# Patient Record
Sex: FEMALE | Race: OTHER | URBAN - METROPOLITAN AREA
[De-identification: names, ages, dates, MRNs, and addresses within clinical notes are randomized per-mention and may not be internally consistent; named-entity substitution may affect disease eponyms.]

---

## 2023-12-12 ENCOUNTER — HOSPITAL ENCOUNTER (EMERGENCY)
Facility: HOSPITAL | Age: 43
Discharge: HOME | End: 2023-12-12
Attending: EMERGENCY MEDICINE

## 2023-12-12 ENCOUNTER — APPOINTMENT (EMERGENCY)
Dept: CT IMAGING | Facility: HOSPITAL | Age: 43
End: 2023-12-12

## 2023-12-12 VITALS
OXYGEN SATURATION: 98 % | TEMPERATURE: 98.2 F | SYSTOLIC BLOOD PRESSURE: 125 MMHG | DIASTOLIC BLOOD PRESSURE: 62 MMHG | BODY MASS INDEX: 30.79 KG/M2 | RESPIRATION RATE: 18 BRPM | HEART RATE: 79 BPM | HEIGHT: 66 IN | WEIGHT: 191.58 LBS

## 2023-12-12 DIAGNOSIS — R11.0 NAUSEA: ICD-10-CM

## 2023-12-12 DIAGNOSIS — R93.5 ABNORMAL CT SCAN, PELVIS: ICD-10-CM

## 2023-12-12 DIAGNOSIS — N30.90 CYSTITIS: Primary | ICD-10-CM

## 2023-12-12 DIAGNOSIS — N28.89 URETERITIS: ICD-10-CM

## 2023-12-12 LAB
ALBUMIN SERPL BCP-MCNC: 4.3 G/DL (ref 3.5–5)
ALP SERPL-CCNC: 88 U/L (ref 34–104)
ALT SERPL W P-5'-P-CCNC: 11 U/L (ref 7–52)
ANION GAP SERPL CALCULATED.3IONS-SCNC: 8 MMOL/L
AST SERPL W P-5'-P-CCNC: 11 U/L (ref 13–39)
BACTERIA UR QL AUTO: ABNORMAL /HPF
BASOPHILS # BLD AUTO: 0.06 THOUSANDS/ÂΜL (ref 0–0.1)
BASOPHILS NFR BLD AUTO: 0 % (ref 0–1)
BILIRUB DIRECT SERPL-MCNC: 0.04 MG/DL (ref 0–0.2)
BILIRUB SERPL-MCNC: 0.26 MG/DL (ref 0.2–1)
BILIRUB UR QL STRIP: NEGATIVE
BUN SERPL-MCNC: 9 MG/DL (ref 5–25)
CALCIUM SERPL-MCNC: 8.9 MG/DL (ref 8.4–10.2)
CHLORIDE SERPL-SCNC: 102 MMOL/L (ref 96–108)
CLARITY UR: ABNORMAL
CO2 SERPL-SCNC: 22 MMOL/L (ref 21–32)
COLOR UR: YELLOW
CREAT SERPL-MCNC: 0.63 MG/DL (ref 0.6–1.3)
EOSINOPHIL # BLD AUTO: 0.13 THOUSAND/ÂΜL (ref 0–0.61)
EOSINOPHIL NFR BLD AUTO: 1 % (ref 0–6)
ERYTHROCYTE [DISTWIDTH] IN BLOOD BY AUTOMATED COUNT: 13.9 % (ref 11.6–15.1)
EXT PREGNANCY TEST URINE: NEGATIVE
EXT. CONTROL: NORMAL
GFR SERPL CREATININE-BSD FRML MDRD: 109 ML/MIN/1.73SQ M
GLUCOSE SERPL-MCNC: 137 MG/DL (ref 65–140)
GLUCOSE UR STRIP-MCNC: NEGATIVE MG/DL
HCT VFR BLD AUTO: 35 % (ref 34.8–46.1)
HGB BLD-MCNC: 11.7 G/DL (ref 11.5–15.4)
HGB UR QL STRIP.AUTO: ABNORMAL
IMM GRANULOCYTES # BLD AUTO: 0.05 THOUSAND/UL (ref 0–0.2)
IMM GRANULOCYTES NFR BLD AUTO: 0 % (ref 0–2)
KETONES UR STRIP-MCNC: NEGATIVE MG/DL
LEUKOCYTE ESTERASE UR QL STRIP: ABNORMAL
LIPASE SERPL-CCNC: 12 U/L (ref 11–82)
LYMPHOCYTES # BLD AUTO: 1.75 THOUSANDS/ÂΜL (ref 0.6–4.47)
LYMPHOCYTES NFR BLD AUTO: 12 % (ref 14–44)
MCH RBC QN AUTO: 27.3 PG (ref 26.8–34.3)
MCHC RBC AUTO-ENTMCNC: 33.4 G/DL (ref 31.4–37.4)
MCV RBC AUTO: 82 FL (ref 82–98)
MONOCYTES # BLD AUTO: 0.68 THOUSAND/ÂΜL (ref 0.17–1.22)
MONOCYTES NFR BLD AUTO: 5 % (ref 4–12)
MUCOUS THREADS UR QL AUTO: ABNORMAL
NEUTROPHILS # BLD AUTO: 11.91 THOUSANDS/ÂΜL (ref 1.85–7.62)
NEUTS SEG NFR BLD AUTO: 82 % (ref 43–75)
NITRITE UR QL STRIP: POSITIVE
NON-SQ EPI CELLS URNS QL MICRO: ABNORMAL /HPF
NRBC BLD AUTO-RTO: 0 /100 WBCS
PH UR STRIP.AUTO: 7.5 [PH]
PLATELET # BLD AUTO: 402 THOUSANDS/UL (ref 149–390)
PMV BLD AUTO: 9.1 FL (ref 8.9–12.7)
POTASSIUM SERPL-SCNC: 3.8 MMOL/L (ref 3.5–5.3)
PROT SERPL-MCNC: 7.8 G/DL (ref 6.4–8.4)
PROT UR STRIP-MCNC: ABNORMAL MG/DL
RBC # BLD AUTO: 4.29 MILLION/UL (ref 3.81–5.12)
RBC #/AREA URNS AUTO: ABNORMAL /HPF
SODIUM SERPL-SCNC: 132 MMOL/L (ref 135–147)
SP GR UR STRIP.AUTO: 1.02 (ref 1–1.03)
UROBILINOGEN UR QL STRIP.AUTO: 0.2 E.U./DL
WBC # BLD AUTO: 14.58 THOUSAND/UL (ref 4.31–10.16)
WBC #/AREA URNS AUTO: ABNORMAL /HPF

## 2023-12-12 PROCEDURE — 99284 EMERGENCY DEPT VISIT MOD MDM: CPT

## 2023-12-12 PROCEDURE — 85025 COMPLETE CBC W/AUTO DIFF WBC: CPT | Performed by: EMERGENCY MEDICINE

## 2023-12-12 PROCEDURE — 87086 URINE CULTURE/COLONY COUNT: CPT | Performed by: EMERGENCY MEDICINE

## 2023-12-12 PROCEDURE — 83690 ASSAY OF LIPASE: CPT | Performed by: EMERGENCY MEDICINE

## 2023-12-12 PROCEDURE — 80076 HEPATIC FUNCTION PANEL: CPT | Performed by: EMERGENCY MEDICINE

## 2023-12-12 PROCEDURE — 99285 EMERGENCY DEPT VISIT HI MDM: CPT | Performed by: EMERGENCY MEDICINE

## 2023-12-12 PROCEDURE — 96375 TX/PRO/DX INJ NEW DRUG ADDON: CPT

## 2023-12-12 PROCEDURE — 81025 URINE PREGNANCY TEST: CPT | Performed by: EMERGENCY MEDICINE

## 2023-12-12 PROCEDURE — 87077 CULTURE AEROBIC IDENTIFY: CPT | Performed by: EMERGENCY MEDICINE

## 2023-12-12 PROCEDURE — 87186 SC STD MICRODIL/AGAR DIL: CPT | Performed by: EMERGENCY MEDICINE

## 2023-12-12 PROCEDURE — 96365 THER/PROPH/DIAG IV INF INIT: CPT

## 2023-12-12 PROCEDURE — 36415 COLL VENOUS BLD VENIPUNCTURE: CPT | Performed by: EMERGENCY MEDICINE

## 2023-12-12 PROCEDURE — 80048 BASIC METABOLIC PNL TOTAL CA: CPT | Performed by: EMERGENCY MEDICINE

## 2023-12-12 PROCEDURE — 74177 CT ABD & PELVIS W/CONTRAST: CPT

## 2023-12-12 PROCEDURE — 81001 URINALYSIS AUTO W/SCOPE: CPT | Performed by: EMERGENCY MEDICINE

## 2023-12-12 RX ORDER — ONDANSETRON 2 MG/ML
4 INJECTION INTRAMUSCULAR; INTRAVENOUS ONCE
Status: COMPLETED | OUTPATIENT
Start: 2023-12-12 | End: 2023-12-12

## 2023-12-12 RX ORDER — ONDANSETRON 4 MG/1
4 TABLET, ORALLY DISINTEGRATING ORAL EVERY 6 HOURS PRN
Qty: 20 TABLET | Refills: 0 | Status: SHIPPED | OUTPATIENT
Start: 2023-12-12

## 2023-12-12 RX ORDER — KETOROLAC TROMETHAMINE 30 MG/ML
15 INJECTION, SOLUTION INTRAMUSCULAR; INTRAVENOUS ONCE
Status: COMPLETED | OUTPATIENT
Start: 2023-12-12 | End: 2023-12-12

## 2023-12-12 RX ORDER — CEFTRIAXONE 1 G/50ML
1000 INJECTION, SOLUTION INTRAVENOUS ONCE
Status: COMPLETED | OUTPATIENT
Start: 2023-12-12 | End: 2023-12-12

## 2023-12-12 RX ORDER — CEFPODOXIME PROXETIL 200 MG/1
200 TABLET, FILM COATED ORAL 2 TIMES DAILY
Qty: 20 TABLET | Refills: 0 | Status: SHIPPED | OUTPATIENT
Start: 2023-12-12 | End: 2023-12-22

## 2023-12-12 RX ADMIN — CEFTRIAXONE 1000 MG: 1 INJECTION, SOLUTION INTRAVENOUS at 03:56

## 2023-12-12 RX ADMIN — KETOROLAC TROMETHAMINE 15 MG: 30 INJECTION INTRAMUSCULAR; INTRAVENOUS at 03:16

## 2023-12-12 RX ADMIN — ONDANSETRON 4 MG: 2 INJECTION INTRAMUSCULAR; INTRAVENOUS at 03:16

## 2023-12-12 RX ADMIN — IOHEXOL 100 ML: 350 INJECTION, SOLUTION INTRAVENOUS at 04:11

## 2023-12-12 NOTE — ED PROVIDER NOTES
History  Chief Complaint   Patient presents with    Flank Pain     L sided flank pain starting yesterday morning, (+) nausea, and pt also reports some urinary symptoms. 67-year-old female no past medical history. Patient presents to the emergency department with abdominal pain, right lower back pain, nausea without emesis, increased frequency of urination, burning with urination. Symptoms started in the last day. No fevers. No vomiting. No vaginal discharge. Normal bowel movements. States she is not pregnant. Abdominal Pain  Pain location:  Generalized  Pain radiates to:  Does not radiate  Pain severity:  Moderate  Onset quality:  Gradual  Duration:  1 day  Timing:  Constant  Progression:  Worsening  Chronicity:  New  Relieved by:  Nothing  Ineffective treatments:  None tried  Associated symptoms: nausea        None       History reviewed. No pertinent past medical history. History reviewed. No pertinent surgical history. History reviewed. No pertinent family history. I have reviewed and agree with the history as documented. E-Cigarette/Vaping    E-Cigarette Use Never User      E-Cigarette/Vaping Substances     Social History     Tobacco Use    Smoking status: Never    Smokeless tobacco: Never   Vaping Use    Vaping Use: Never used   Substance Use Topics    Alcohol use: Not Currently    Drug use: Never       Review of Systems   Gastrointestinal:  Positive for abdominal pain and nausea. Musculoskeletal:  Positive for back pain. All other systems reviewed and are negative. Physical Exam  Physical Exam  Vitals and nursing note reviewed. Constitutional:       General: She is not in acute distress. Appearance: Normal appearance. She is not ill-appearing. HENT:      Head: Normocephalic and atraumatic.       Right Ear: External ear normal.      Left Ear: External ear normal.      Nose: Nose normal.      Mouth/Throat:      Mouth: Mucous membranes are moist.   Eyes:      General: Right eye: No discharge. Left eye: No discharge. Conjunctiva/sclera: Conjunctivae normal.   Cardiovascular:      Rate and Rhythm: Normal rate and regular rhythm. Pulses: Normal pulses. Heart sounds: No murmur heard. Pulmonary:      Effort: Pulmonary effort is normal.      Breath sounds: Normal breath sounds. Abdominal:      General: Abdomen is flat. There is no distension. Tenderness: There is abdominal tenderness. There is no guarding or rebound. Comments: Diffusely painful in the abdomen. Worse in the right lower quadrant. Musculoskeletal:         General: Normal range of motion. Cervical back: Normal range of motion. Comments: No CVA tenderness. No pain on palpation of the paraspinal musculature. Skin:     General: Skin is warm. Capillary Refill: Capillary refill takes less than 2 seconds. Findings: No rash. Neurological:      General: No focal deficit present. Mental Status: She is alert. Mental status is at baseline.    Psychiatric:         Mood and Affect: Mood normal.         Behavior: Behavior normal.         Vital Signs  ED Triage Vitals [12/12/23 0301]   Temperature Pulse Respirations Blood Pressure SpO2   98.2 °F (36.8 °C) 98 18 152/67 98 %      Temp Source Heart Rate Source Patient Position - Orthostatic VS BP Location FiO2 (%)   Oral Monitor Sitting Left arm --      Pain Score       10 - Worst Possible Pain           Vitals:    12/12/23 0301 12/12/23 0415 12/12/23 0430 12/12/23 0500   BP: 152/67 139/63 123/58 125/62   Pulse: 98 84 81 79   Patient Position - Orthostatic VS: Sitting Lying Lying Lying         Visual Acuity      ED Medications  Medications   ondansetron (ZOFRAN) injection 4 mg (4 mg Intravenous Given 12/12/23 0316)   ketorolac (TORADOL) injection 15 mg (15 mg Intravenous Given 12/12/23 0316)   cefTRIAXone (ROCEPHIN) IVPB (premix in dextrose) 1,000 mg 50 mL (0 mg Intravenous Stopped 12/12/23 0446)   iohexol (OMNIPAQUE) 350 MG/ML injection (SINGLE-DOSE) 100 mL (100 mL Intravenous Given 12/12/23 0411)       Diagnostic Studies  Results Reviewed       Procedure Component Value Units Date/Time    Basic metabolic panel [[id number]]  (Abnormal) Collected: 12/12/23 0310    Lab Status: Final result Specimen: Blood from Arm, Right Updated: 12/12/23 0338     Sodium 132 mmol/L      Potassium 3.8 mmol/L      Chloride 102 mmol/L      CO2 22 mmol/L      ANION GAP 8 mmol/L      BUN 9 mg/dL      Creatinine 0.63 mg/dL      Glucose 137 mg/dL      Calcium 8.9 mg/dL      eGFR 109 ml/min/1.73sq m     Narrative:      [place] guidelines for Chronic Kidney Disease (CKD):     Stage 1 with normal or high GFR (GFR > 90 mL/min/1.73 square meters)    Stage 2 Mild CKD (GFR = 60-89 mL/min/1.73 square meters)    Stage 3A Moderate CKD (GFR = 45-59 mL/min/1.73 square meters)    Stage 3B Moderate CKD (GFR = 30-44 mL/min/1.73 square meters)    Stage 4 Severe CKD (GFR = 15-29 mL/min/1.73 square meters)    Stage 5 End Stage CKD (GFR <15 mL/min/1.73 square meters)  Note: GFR calculation is accurate only with a steady state creatinine    Hepatic function panel [[id number]]  (Abnormal) Collected: 12/12/23 0310    Lab Status: Final result Specimen: Blood from Arm, Right Updated: 12/12/23 0338     Total Bilirubin 0.26 mg/dL      Bilirubin, Direct 0.04 mg/dL      Alkaline Phosphatase 88 U/L      AST 11 U/L      ALT 11 U/L      Total Protein 7.8 g/dL      Albumin 4.3 g/dL     Lipase [[id number]]  (Normal) Collected: 12/12/23 0310    Lab Status: Final result Specimen: Blood from Arm, Right Updated: 12/12/23 0338     Lipase 12 u/L     Urine Microscopic [[id number]]  (Abnormal) Collected: 12/12/23 0312    Lab Status: Final result Specimen: Urine, Clean Catch Updated: 12/12/23 0333     RBC, UA 10-20 /hpf      WBC, UA 30-50 /hpf      Epithelial Cells Occasional /hpf      Bacteria, UA Moderate /hpf      MUCUS THREADS Occasional    Urine culture [[id number]] Collected: 12/12/23 0312    Lab Status: In process Specimen: Urine, Clean Catch Updated: 12/12/23 0333    POCT pregnancy, urine [[id number]]  (Normal) Resulted: 12/12/23 0315    Lab Status: Final result Updated: 12/12/23 0322     EXT Preg Test, Ur Negative     Control Valid    UA w Reflex to Microscopic w Reflex to Culture [[id number]]  (Abnormal) Collected: 12/12/23 0312    Lab Status: Final result Specimen: Urine, Clean Catch Updated: 12/12/23 0319     Color, UA Yellow     Clarity, UA Cloudy     Specific Gravity, UA 1.020     pH, UA 7.5     Leukocytes, UA 2+     Nitrite, UA Positive     Protein, UA 2+ mg/dl      Glucose, UA Negative mg/dl      Ketones, UA Negative mg/dl      Urobilinogen, UA 0.2 E.U./dl      Bilirubin, UA Negative     Occult Blood, UA 2+    CBC and differential [[id number]]  (Abnormal) Collected: 12/12/23 0310    Lab Status: Final result Specimen: Blood from Arm, Right Updated: 12/12/23 0319     WBC 14.58 Thousand/uL      RBC 4.29 Million/uL      Hemoglobin 11.7 g/dL      Hematocrit 35.0 %      MCV 82 fL      MCH 27.3 pg      MCHC 33.4 g/dL      RDW 13.9 %      MPV 9.1 fL      Platelets 828 Thousands/uL      nRBC 0 /100 WBCs      Neutrophils Relative 82 %      Immat GRANS % 0 %      Lymphocytes Relative 12 %      Monocytes Relative 5 %      Eosinophils Relative 1 %      Basophils Relative 0 %      Neutrophils Absolute 11.91 Thousands/µL      Immature Grans Absolute 0.05 Thousand/uL      Lymphocytes Absolute 1.75 Thousands/µL      Monocytes Absolute 0.68 Thousand/µL      Eosinophils Absolute 0.13 Thousand/µL      Basophils Absolute 0.06 Thousands/µL                    CT abdomen pelvis with contrast   Final Result by Edvin Kemp DO (12/12 3772)      Mild wall thickening of the urinary bladder with urothelial thickening of the bilateral ureters. Correlate for cystitis with bilateral ureteritis. Leiomyomatous uterus. Recommend outpatient pelvic ultrasound for complete evaluation.          The study was marked in [facility] for immediate notification. Workstation performed: [id number]                    Procedures  Procedures         ED Course  ED Course as of 12/12/23 0537   Tue Dec 12, 2023   0345 Nitrite, UA(!): Positive   0345 Leukocytes, UA(!): 2+   0346 WBC(!): 14.58                               SBIRT 20yo+      Flowsheet Row Most Recent Value   Initial Alcohol Screen: US AUDIT-C     1. How often do you have a drink containing alcohol? 0 Filed at: 12/12/2023 0322   2. How many drinks containing alcohol do you have on a typical day you are drinking? 0 Filed at: 12/12/2023 0322   3a. Male UNDER 65: How often do you have five or more drinks on one occasion? 0 Filed at: 12/12/2023 0322   3b. FEMALE Any Age, or MALE 65+: How often do you have 4 or more drinks on one occassion? 0 Filed at: 12/12/2023 0322   Audit-C Score 0 Filed at: 12/12/2023 2618   DALI: How many times in the past year have you. .. Used an illegal drug or used a prescription medication for non-medical reasons? Never Filed at: 12/12/2023 2488                      Medical Decision Making  Patient with diffuse abdominal pain. Worse in the right lower quadrant. Will evaluate for nephrolithiasis, urolithiasis, urinary tract infection, colitis, diverticulitis, pancreatitis other acute intra-abdominal processes. CT scan shows ureteritis, cystitis. No obvious pyelonephritis. No fevers. CT scan also shows abnormality of the uterus. Patient informed of this. Recommended that she follow-up with an OB/GYN for ultrasound. She expressed understanding this. Patient appears hemodynamically stable. Is afebrile. Is not having episodes of vomiting. Will place on 10 days of cefpodoxime. Strict return precautions discussed for fevers, worsening pain, inability to keep down medication, new or worsening symptoms. Patient expressed understanding this.       Problems Addressed:  Abnormal CT scan, pelvis: acute illness or injury  Cystitis: acute illness or injury  Nausea: acute illness or injury  Ureteritis: acute illness or injury    Amount and/or Complexity of Data Reviewed  Labs: ordered. Decision-making details documented in ED Course. Radiology: ordered. Risk  Prescription drug management. Disposition  Final diagnoses:   Cystitis   Ureteritis   Abnormal CT scan, pelvis - Leiomyomatous uterus   Nausea     Time reflects when diagnosis was documented in both MDM as applicable and the Disposition within this note       Time User Action Codes Description Comment    12/12/2023  4:50 AM Lionel Pool Add [N30.90] Cystitis     12/12/2023  4:50 AM Lionel Pool Add [N28.89] Ureteritis     12/12/2023  4:50 AM Ag Glass Add [R93.5] Abnormal CT scan, pelvis     12/12/2023  4:51 AM Lionel Pool Modify [R93.5] Abnormal CT scan, pelvis Leiomyomatous uterus    12/12/2023  4:53 AM Lionel Pool Add [R11.0] Nausea           ED Disposition       ED Disposition   Discharge    Condition   Stable    Date/Time   Tue Dec 12, 2023 0454    Comment   Pippa [facility] discharge to home/self care.                    Follow-up Information       Follow up With Specialties Details Why Contact Info Additional [address] [facility]) Urology Schedule an appointment as soon as possible for a visit   [address] [phone]  [address] [place] [address] For Urology [facility]), 325 [facility] [place], [facility]), [address], [address] Emergency Department Emergency Medicine  If symptoms worsen [address] [phone] 2[address] [facility] Emergency Department, [address] [place] [address] , [address] [facility] [address]            Discharge Medication List as of 12/12/2023  4:54 AM        START taking these medications    Details   cefpodoxime (VANTIN) 200 mg tablet Take 1 tablet (200 mg total) by mouth 2 (two) times a day for 10 days, Starting Tue 12/12/2023, Until Fri 12/22/2023, Normal      ondansetron (Zofran ODT) 4 mg disintegrating tablet Take 1 tablet (4 mg total) by mouth every 6 (six) hours as needed for nausea or vomiting, Starting Tue 12/12/2023, Normal             No discharge procedures on file.     PDMP Review       None            ED Provider  Electronically Signed by             Francois Olivo DO  12/12/23 8113

## 2023-12-12 NOTE — DISCHARGE INSTRUCTIONS
CT scan and urine test shows a urinary tract infection. There also appears to be inflammation of your ureters which are the tubes in the care urine from your kidneys to your bladder. In addition CT scan shows Leiomyomatous uterus. Recommend outpatient pelvic ultrasound for complete evaluation. Follow-up with your OB/GYN regarding this. Take the cefpodoxime twice daily for the next 10 days. Use the Zofran every 6 hours as needed for nausea. Come back for new or worsening symptoms including but not limited to worsening flank pain, fevers, inability to keep down your medication.

## 2023-12-12 NOTE — Clinical Note
Norelis Coreen Bamberger was seen and treated in our emergency department on 12/12/2023. No restrictions            Diagnosis:     Pippa  may return to work on return date. She may return on this date: 12/13/2023         If you have any questions or concerns, please don't hesitate to call.       Aminah Domingo RN    ______________________________           _______________          _______________  Hospital Representative                              Date                                Time

## 2023-12-14 LAB — BACTERIA UR CULT: ABNORMAL
